# Patient Record
Sex: MALE | Race: WHITE | NOT HISPANIC OR LATINO | Employment: FULL TIME | URBAN - METROPOLITAN AREA
[De-identification: names, ages, dates, MRNs, and addresses within clinical notes are randomized per-mention and may not be internally consistent; named-entity substitution may affect disease eponyms.]

---

## 2023-07-03 PROBLEM — R97.20 INCREASED PROSTATE SPECIFIC ANTIGEN (PSA) VELOCITY: Status: ACTIVE | Noted: 2023-07-03

## 2023-07-03 PROBLEM — K64.9 HEMORRHOIDS: Status: ACTIVE | Noted: 2023-07-03

## 2023-07-03 PROBLEM — K64.8 INTERNAL AND EXTERNAL HEMORRHOIDS WITHOUT COMPLICATION: Status: ACTIVE | Noted: 2023-07-03

## 2023-07-03 PROBLEM — K64.4 EXTERNAL HEMORRHOIDS: Status: ACTIVE | Noted: 2023-07-03

## 2023-07-03 PROBLEM — Z97.3 WEARS GLASSES: Status: ACTIVE | Noted: 2023-07-03

## 2023-07-03 PROBLEM — E04.1 RIGHT THYROID NODULE: Status: ACTIVE | Noted: 2023-07-03

## 2023-07-03 PROBLEM — G89.29 CHRONIC HEADACHES: Status: ACTIVE | Noted: 2023-07-03

## 2023-07-03 PROBLEM — K21.9 ACID REFLUX: Status: ACTIVE | Noted: 2023-07-03

## 2023-07-03 PROBLEM — R51.9 CHRONIC HEADACHES: Status: ACTIVE | Noted: 2023-07-03

## 2023-07-03 PROBLEM — M25.512 CHRONIC LEFT SHOULDER PAIN: Status: ACTIVE | Noted: 2023-07-03

## 2023-07-03 PROBLEM — F41.9 MILD ANXIETY: Status: ACTIVE | Noted: 2023-07-03

## 2023-07-03 PROBLEM — G89.29 CHRONIC LEFT SHOULDER PAIN: Status: ACTIVE | Noted: 2023-07-03

## 2023-07-03 PROBLEM — M21.42 FALLEN ARCHES: Status: ACTIVE | Noted: 2023-07-03

## 2023-07-03 PROBLEM — K64.4 INTERNAL AND EXTERNAL HEMORRHOIDS WITHOUT COMPLICATION: Status: ACTIVE | Noted: 2023-07-03

## 2023-07-03 PROBLEM — N40.0 BENIGN ENLARGEMENT OF PROSTATE: Status: ACTIVE | Noted: 2023-07-03

## 2023-07-03 PROBLEM — J98.8 RESPIRATORY TRACT INFECTION DUE TO COVID-19 VIRUS: Status: ACTIVE | Noted: 2023-07-03

## 2023-07-03 PROBLEM — E78.5 HYPERLIPIDEMIA: Status: ACTIVE | Noted: 2023-07-03

## 2023-07-03 PROBLEM — M21.41 FALLEN ARCHES: Status: ACTIVE | Noted: 2023-07-03

## 2023-07-03 PROBLEM — U07.1 RESPIRATORY TRACT INFECTION DUE TO COVID-19 VIRUS: Status: ACTIVE | Noted: 2023-07-03

## 2023-07-03 PROBLEM — N48.6 PEYRONIE DISEASE: Status: ACTIVE | Noted: 2023-07-03

## 2023-07-03 PROBLEM — R42 VERTIGO: Status: ACTIVE | Noted: 2023-07-03

## 2023-07-03 PROBLEM — J30.9 ALLERGIC RHINITIS: Status: ACTIVE | Noted: 2023-07-03

## 2023-07-03 PROBLEM — D12.6 TUBULAR ADENOMA OF COLON: Status: ACTIVE | Noted: 2023-07-03

## 2023-07-03 RX ORDER — MULTIVITAMIN
TABLET ORAL
COMMUNITY
Start: 2014-07-28

## 2023-07-03 RX ORDER — ATORVASTATIN CALCIUM 10 MG/1
TABLET, FILM COATED ORAL
COMMUNITY
Start: 2022-02-15 | End: 2024-02-01

## 2023-07-13 ENCOUNTER — OFFICE VISIT (OUTPATIENT)
Dept: PRIMARY CARE | Facility: CLINIC | Age: 63
End: 2023-07-13
Payer: COMMERCIAL

## 2023-07-13 VITALS
BODY MASS INDEX: 22.21 KG/M2 | WEIGHT: 152.6 LBS | OXYGEN SATURATION: 98 % | DIASTOLIC BLOOD PRESSURE: 74 MMHG | TEMPERATURE: 98.2 F | RESPIRATION RATE: 16 BRPM | SYSTOLIC BLOOD PRESSURE: 118 MMHG | HEART RATE: 68 BPM

## 2023-07-13 DIAGNOSIS — E78.5 DYSLIPIDEMIA, GOAL LDL BELOW 100: ICD-10-CM

## 2023-07-13 DIAGNOSIS — R42 VERTIGO: ICD-10-CM

## 2023-07-13 DIAGNOSIS — D12.6 TUBULAR ADENOMA OF COLON: ICD-10-CM

## 2023-07-13 DIAGNOSIS — J30.1 SEASONAL ALLERGIC RHINITIS DUE TO POLLEN: ICD-10-CM

## 2023-07-13 DIAGNOSIS — N40.1 BENIGN PROSTATIC HYPERPLASIA WITH NOCTURIA: ICD-10-CM

## 2023-07-13 DIAGNOSIS — Z71.85 IMMUNIZATION COUNSELING: Primary | ICD-10-CM

## 2023-07-13 DIAGNOSIS — E04.1 RIGHT THYROID NODULE: ICD-10-CM

## 2023-07-13 DIAGNOSIS — R35.1 BENIGN PROSTATIC HYPERPLASIA WITH NOCTURIA: ICD-10-CM

## 2023-07-13 PROBLEM — U07.1 RESPIRATORY TRACT INFECTION DUE TO COVID-19 VIRUS: Status: RESOLVED | Noted: 2023-07-03 | Resolved: 2023-07-13

## 2023-07-13 PROBLEM — J98.8 RESPIRATORY TRACT INFECTION DUE TO COVID-19 VIRUS: Status: RESOLVED | Noted: 2023-07-03 | Resolved: 2023-07-13

## 2023-07-13 PROCEDURE — 1036F TOBACCO NON-USER: CPT | Performed by: INTERNAL MEDICINE

## 2023-07-13 PROCEDURE — 99213 OFFICE O/P EST LOW 20 MIN: CPT | Performed by: INTERNAL MEDICINE

## 2023-07-13 ASSESSMENT — ENCOUNTER SYMPTOMS
LOSS OF SENSATION IN FEET: 0
OCCASIONAL FEELINGS OF UNSTEADINESS: 0
DEPRESSION: 0

## 2023-07-13 ASSESSMENT — PATIENT HEALTH QUESTIONNAIRE - PHQ9
1. LITTLE INTEREST OR PLEASURE IN DOING THINGS: NOT AT ALL
2. FEELING DOWN, DEPRESSED OR HOPELESS: NOT AT ALL
SUM OF ALL RESPONSES TO PHQ9 QUESTIONS 1 AND 2: 0

## 2023-07-13 NOTE — PROGRESS NOTES
Subjective   Patient ID: Patrice Licona is a 62 y.o. male who presents for Follow-up.    Here for semiannual visit  Feels well without illnesses or injuries.  Enjoying summer vacation off from school-he is doing some creative work painting, which he has not done for couple years.  Is been enjoyable.         Review of Systems    Objective   /74 (BP Location: Left arm, Patient Position: Sitting, BP Cuff Size: Adult)   Pulse 68   Temp 36.8 °C (98.2 °F)   Resp 16   Wt 69.2 kg (152 lb 9.6 oz)   SpO2 98%   BMI 22.21 kg/m²     Physical Exam  Vitals reviewed.   Constitutional:       Appearance: Normal appearance.   HENT:      Head: Normocephalic and atraumatic.   Eyes:      General: No scleral icterus.        Right eye: No discharge.         Left eye: No discharge.      Extraocular Movements: Extraocular movements intact.      Conjunctiva/sclera: Conjunctivae normal.      Pupils: Pupils are equal, round, and reactive to light.   Cardiovascular:      Rate and Rhythm: Normal rate and regular rhythm.      Pulses: Normal pulses.      Heart sounds: Normal heart sounds. No murmur heard.  Pulmonary:      Effort: Pulmonary effort is normal.      Breath sounds: Normal breath sounds. No wheezing or rhonchi.   Musculoskeletal:         General: No deformity or signs of injury. Normal range of motion.      Cervical back: Normal range of motion and neck supple. No rigidity or tenderness.   Lymphadenopathy:      Cervical: No cervical adenopathy.   Skin:     General: Skin is warm and dry.      Findings: No rash.   Neurological:      General: No focal deficit present.      Mental Status: He is alert and oriented to person, place, and time. Mental status is at baseline.      Cranial Nerves: No cranial nerve deficit.      Sensory: No sensory deficit.      Gait: Gait normal.   Psychiatric:         Mood and Affect: Mood normal.         Behavior: Behavior normal.         Thought Content: Thought content normal.         Judgment:  Judgment normal.         Assessment/Plan   Problem List Items Addressed This Visit       Allergic rhinitis    Benign enlargement of prostate    Relevant Orders    Prostate Specific Antigen    Right thyroid nodule    Tubular adenoma of colon    Vertigo    Relevant Orders    Basic Metabolic Panel     Other Visit Diagnoses       Immunization counseling    -  Primary    Relevant Medications    zoster vaccine-recombinant adjuvanted (Shingrix) 50 mcg/0.5 mL vaccine    Dyslipidemia, goal LDL below 100        Relevant Orders    Lipid Panel    TSH with reflex to Free T4 if abnormal    Alanine Aminotransferase             Dyslipidemia- Atorvastatin started in 2021 w/ elevated 10 yr cardiac risk; goal LDL under 100.  Will follow.          annual lipids ordered     Exercise routine-enjoys walking. Encouraged more aerobic activity as he is able to     Right thyroid nodule-he states his endocrinologist at the [facility] did not feel further follow-up was necessary and so he no longer sees him. Nondiagnostic biopsy/aspiration 2010.    Stable ultrasound July 2019 for 2 yrs showing 1 x 1 cm hypoechoic right thyroid nodule. Will plan to re-image at 4 yrs           Feb '23 thyroid ultrasound shows right thyroid nodule benign.      Seasonal allergies-he will use medication seasonally. Not problematic of late; Zyrtec and Flonase PRN.   More winter allergies. Several weeks of congestion sinus pressure and ear popping. Discussed anatomy and vertigo. Encouraged Flonase and Zyrtec consistently for 3 to 4 weeks then as needed     Tension buzfkbeld-wfyhqlhiscyl-sbfslitb imaging in neurology by Dr. Fu around 2012. During the pandemic he has noticed more headaches. This year he is commuting back to school. The students do not really want to be in school. He remains on his sertraline which has been helpful.    Encourage stress reduction measures. He does enjoy painting which he did a lot of in the summer, puzzles and playing his guitar.  Suggested meditation apps including head space. He enjoys walking-suggested exercising to help reduce stress as well. Heating pad and stretching for the neck encouraged-exercises provided               Presently headaches are improved        Mild depression-doing well with present routine and sertraline. He will continue healthy efforts towards maintaining his balance.    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 Looking forward to getting up to [employer] in their RV for camping around [place].               Summer vacation has gone well.  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  Its been more relaxing this summer.     Adenomatous colon polyps/Family history of colon cancer-. Colonoscopy updated June 2019- next in 5 years-June 2024.      External hemorrhoids-rather bothersome as they come out daily after bowel movement and he has to push them back in. He is taking fluid and fiber. I suggested Desitin ointment as needed and follow-up with the colorectal clinic.   Improved presently, w/ metamucil, following visit w/ Dr. Golden for int. hem. Jun & Aug '22.     Elevated PSA velocity - 1st prostate biopsy negative around age 56, worrisome MRI February 22 prompted a 2nd prostate biopsy Apr '22 - negative. He'll see Dr. Amaya in 1 yr - Apr '23.              Annual PSA ordered for next time.      Left wrist injury-after a fall in 2020- occasionally sore-he is right-handed. He will use caution with overuse     C-spine spasms-heating pad and stretching encouraged consistently to help minimize tension headache concern     Recurrent/chronic left shoulder pain-a bit worse recently lifting is done. He will see Dr. English as needed. Using a chiropractor several times, which has helped. Improved w/ input per Chiropractor   Shoulder much better not carrying heavy book bag   At this point both shoulders are a problem. Once again exercises provided encouraged him to do these consistently and  follow-up with physical therapy if not improved     Low back pain - after moving. improved w/ chiropractor input.     Tingling in his toes-  Less noticeable presently with changing shoes suggesting compressive neuropathy     fallen arches -he now is on his feet as an  most of the day. Encouraged optimizing arch support accordingly     Acid reflux he will continue medications accordingly        Dental care-encouraged semiannual dental visits. Dental exam this week.     Skin care-he will continue dermatology as needed. sun screen encouraged   Dermatology appt. in Jan '22 for skin check     Glasses/vision care- vision exam updated early 2022; no changes fortunately.      Flu shot encouraged each fall-      covid series completed- booster done too; new booster declined 12/22     Discussed Shingrix / new shingles shot - 2 shot series w/ limited availability. Encouraged patient to consider getting this when/ where available.    discussed / declined 12/22     Follow-up semiannually, sooner as needed     Charting was completed using voice recognition technology and may include unintended errors.

## 2024-01-15 ENCOUNTER — OFFICE VISIT (OUTPATIENT)
Dept: PRIMARY CARE | Facility: CLINIC | Age: 64
End: 2024-01-15
Payer: COMMERCIAL

## 2024-01-15 VITALS
RESPIRATION RATE: 16 BRPM | HEART RATE: 81 BPM | HEIGHT: 70 IN | OXYGEN SATURATION: 97 % | BODY MASS INDEX: 22.36 KG/M2 | WEIGHT: 156.2 LBS | DIASTOLIC BLOOD PRESSURE: 73 MMHG | SYSTOLIC BLOOD PRESSURE: 115 MMHG | TEMPERATURE: 98.2 F

## 2024-01-15 DIAGNOSIS — M62.838 CERVICAL PARASPINAL MUSCLE SPASM: ICD-10-CM

## 2024-01-15 DIAGNOSIS — E78.2 MIXED HYPERLIPIDEMIA: ICD-10-CM

## 2024-01-15 DIAGNOSIS — Z23 IMMUNIZATION DUE: Primary | ICD-10-CM

## 2024-01-15 DIAGNOSIS — R07.89 ATYPICAL CHEST PAIN: ICD-10-CM

## 2024-01-15 DIAGNOSIS — J30.1 SEASONAL ALLERGIC RHINITIS DUE TO POLLEN: ICD-10-CM

## 2024-01-15 DIAGNOSIS — D12.6 ADENOMATOUS POLYP OF COLON, UNSPECIFIED PART OF COLON: ICD-10-CM

## 2024-01-15 DIAGNOSIS — Z97.3 WEARS GLASSES: ICD-10-CM

## 2024-01-15 PROCEDURE — 1036F TOBACCO NON-USER: CPT | Performed by: INTERNAL MEDICINE

## 2024-01-15 PROCEDURE — 93000 ELECTROCARDIOGRAM COMPLETE: CPT | Performed by: INTERNAL MEDICINE

## 2024-01-15 PROCEDURE — 99396 PREV VISIT EST AGE 40-64: CPT | Performed by: INTERNAL MEDICINE

## 2024-01-15 ASSESSMENT — PATIENT HEALTH QUESTIONNAIRE - PHQ9
2. FEELING DOWN, DEPRESSED OR HOPELESS: NOT AT ALL
1. LITTLE INTEREST OR PLEASURE IN DOING THINGS: NOT AT ALL
SUM OF ALL RESPONSES TO PHQ9 QUESTIONS 1 AND 2: 0

## 2024-01-15 ASSESSMENT — ENCOUNTER SYMPTOMS
LOSS OF SENSATION IN FEET: 0
OCCASIONAL FEELINGS OF UNSTEADINESS: 0
DEPRESSION: 0

## 2024-01-15 NOTE — PROGRESS NOTES
"Subjective   Patient ID: Patrice Licona is a 63 y.o. male who presents for Annual Exam.    Here for semiannual visit and wellness visit.  Overall doing well.      He does notice left upper chest wall pain-it tends to come and go without exertion no swallowing issues no heartburn no cough or shortness of breath or respiratory symptoms.    Denies any illnesses or injuries no exertional chest pain, palpitations, dizziness, orthopnea or pedal edema.    Additionally he notices neck popping and occasional stiffness           Review of Systems    Objective   /73 (BP Location: Left arm, Patient Position: Sitting, BP Cuff Size: Adult)   Pulse 81   Temp 36.8 °C (98.2 °F)   Resp 16   Ht 1.765 m (5' 9.5\")   Wt 70.9 kg (156 lb 3.2 oz)   SpO2 97%   BMI 22.74 kg/m²     Physical Exam  Constitutional:       Appearance: Normal appearance.   HENT:      Head: Normocephalic and atraumatic.      Right Ear: Tympanic membrane normal.      Left Ear: Tympanic membrane normal.      Nose: Nose normal.   Eyes:      General: No scleral icterus.     Extraocular Movements: Extraocular movements intact.      Conjunctiva/sclera: Conjunctivae normal.      Pupils: Pupils are equal, round, and reactive to light.   Cardiovascular:      Rate and Rhythm: Normal rate and regular rhythm.      Pulses: Normal pulses.      Heart sounds: Normal heart sounds. No murmur heard.  Pulmonary:      Effort: Pulmonary effort is normal. No respiratory distress.      Breath sounds: Normal breath sounds. No stridor. No wheezing.   Abdominal:      General: Abdomen is flat. Bowel sounds are normal. There is no distension.      Palpations: Abdomen is soft. There is no mass.      Tenderness: There is no abdominal tenderness. There is no guarding.   Musculoskeletal:         General: No swelling, tenderness or deformity. Normal range of motion.      Cervical back: Normal range of motion and neck supple. No tenderness.   Lymphadenopathy:      Cervical: No cervical " adenopathy.   Skin:     General: Skin is warm and dry.      Findings: No lesion or rash.   Neurological:      General: No focal deficit present.      Mental Status: He is alert and oriented to person, place, and time.      Cranial Nerves: No cranial nerve deficit.      Motor: No weakness.   Psychiatric:         Mood and Affect: Mood normal.         Behavior: Behavior normal.         Thought Content: Thought content normal.         Judgment: Judgment normal.         Assessment/Plan   Problem List Items Addressed This Visit             ICD-10-CM    Allergic rhinitis J30.9    Hyperlipidemia E78.5    Relevant Orders    CT cardiac scoring wo IV contrast    Wears glasses Z97.3     Other Visit Diagnoses         Codes    Immunization due    -  Primary Z23    Relevant Medications    respiratory syncytial virus, RSV, vaccine, adjuvanted, age 65y+ (AREXVY) 120 mcg/0.5 mL suspension for reconstitution    Adenomatous polyp of colon, unspecified part of colon     D12.6    Relevant Orders    Colonoscopy Diagnostic    Cervical paraspinal muscle spasm     M62.838    Relevant Orders    XR cervical spine 2-3 views    Atypical chest pain     R07.89    Relevant Orders    XR chest 2 views             Incomplete database-blood work ordered last summer not yet done-he will do soon    Chest pain syndrome-not exertional-rather atypical.  Chest x-ray ordered      Dyslipidemia- Atorvastatin started in 2021 w/ elevated 10 yr cardiac risk; goal LDL under 100.  Will follow.          annual lipids ordered last time-awaiting results.  He will set up a CT calcium score for further evaluation accordingly     Exercise routine-enjoys walking. Encouraged more aerobic activity as he is able to     Right thyroid nodule-he states his endocrinologist at the [facility] did not feel further follow-up was necessary and so he no longer sees him. Nondiagnostic biopsy/aspiration 2010.    Stable ultrasound July 2019 for 2 yrs showing 1 x 1 cm hypoechoic  right thyroid nodule. Will plan to re-image at 4 yrs           Feb '23 thyroid ultrasound shows right thyroid nodule benign.      Seasonal allergies-he will use medication seasonally. Not problematic of late; Zyrtec and Flonase PRN.   More winter allergies. Several weeks of congestion sinus pressure and ear popping. Discussed anatomy and vertigo. Encouraged Flonase and Zyrtec consistently for 3 to 4 weeks then as needed    Cervical spasms-with occasional neck popping-x-rays ordered 1/24.  Handout provided with 2 pages of exercises-encouraged 20 minutes once or twice daily with a heating pad and stretching and follow-up if not improved     Tension bonxkbwpb-wolgwtadfkhy-rguoiofe imaging in neurology by Dr. Fu around 2012. During the pandemic he has noticed more headaches. This year he is commuting back to school. The students do not really want to be in school. He remains on his sertraline which has been helpful.    Encourage stress reduction measures. He does enjoy painting which he did a lot of in the summer, puzzles and playing his guitar. Suggested meditation apps including head space. He enjoys walking-suggested exercising to help reduce stress as well. Heating pad and stretching for the neck encouraged-exercises provided               Presently headaches are improved        Mild depression-doing well with present routine and sertraline. He will continue healthy efforts towards maintaining his balance.    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 Looking forward to getting up to [employer] in their RV for camping around [place].               Summer vacation has gone well.  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  Its been more relaxing this summer.    Acid reflux he will continue medications accordingly     Adenomatous colon polyps/Family history of colon cancer-. Colonoscopy updated June 2019-             next in 5 years-June 2024.  Ordered     External  hemorrhoids-rather bothersome as they come out daily after bowel movement and he has to push them back in. He is taking fluid and fiber. I suggested Desitin ointment as needed and follow-up with the colorectal clinic.   Improved presently, w/ metamucil, following visit w/ Dr. Golden for int. hem. Jun & Aug '22.     Elevated PSA velocity - 1st prostate biopsy negative around age 56, worrisome MRI February 22 prompted a 2nd prostate biopsy Apr '22 - negative. He'll see Dr. Amaya in 1 yr - Apr '23.              Annual PSA ordered for next time.      Left wrist injury-after a fall in 2020- occasionally sore-he is right-handed. He will use caution with overuse     C-spine spasms-heating pad and stretching encouraged consistently to help minimize tension headache concern     Recurrent/chronic left shoulder pain-a bit worse recently lifting is done. He will see Dr. English as needed. Using a chiropractor several times, which has helped. Improved w/ input per Chiropractor   Shoulder much better not carrying heavy book bag   At this point both shoulders are a problem. Once again exercises provided encouraged him to do these consistently and follow-up with physical therapy if not improved     Low back pain - after moving. improved w/ chiropractor input.     Tingling in his toes-  Less noticeable presently with changing shoes suggesting compressive neuropathy     fallen arches -he now is on his feet as an  most of the day. Encouraged optimizing arch support accordingly        Dental care-encouraged semiannual dental visits.             Dental exam due for tooth pain     Skin care-he will continue dermatology as needed. sun screen encouraged   Dermatology appt. in Jan '22 for skin check     Glasses/vision care- vision exam updated early 2022; no changes fortunately.      Flu shot encouraged each fall- Sep '23     covid series completed- booster done too;           new booster declined 12/22     Discussed Shingrix /  new shingles shot - 2 shot series w/ limited availability. Encouraged patient to consider getting this when/ where available.                  1st injection Sep '23; he'll do the 2nd soon    RSV vacc encouraged     Follow-up semiannually, sooner as needed     Charting was completed using voice recognition technology and may include unintended errors.

## 2024-01-27 ENCOUNTER — LAB (OUTPATIENT)
Dept: LAB | Facility: LAB | Age: 64
End: 2024-01-27
Payer: COMMERCIAL

## 2024-01-27 ENCOUNTER — HOSPITAL ENCOUNTER (OUTPATIENT)
Dept: RADIOLOGY | Facility: CLINIC | Age: 64
Discharge: HOME | End: 2024-01-27
Payer: COMMERCIAL

## 2024-01-27 DIAGNOSIS — R35.1 BENIGN PROSTATIC HYPERPLASIA WITH NOCTURIA: ICD-10-CM

## 2024-01-27 DIAGNOSIS — R07.89 ATYPICAL CHEST PAIN: ICD-10-CM

## 2024-01-27 DIAGNOSIS — M62.838 CERVICAL PARASPINAL MUSCLE SPASM: ICD-10-CM

## 2024-01-27 DIAGNOSIS — E78.5 DYSLIPIDEMIA, GOAL LDL BELOW 100: ICD-10-CM

## 2024-01-27 DIAGNOSIS — N40.1 BENIGN PROSTATIC HYPERPLASIA WITH NOCTURIA: ICD-10-CM

## 2024-01-27 DIAGNOSIS — R42 VERTIGO: ICD-10-CM

## 2024-01-27 LAB
ALT SERPL W P-5'-P-CCNC: 24 U/L (ref 10–52)
ANION GAP SERPL CALC-SCNC: 11 MMOL/L (ref 10–20)
BUN SERPL-MCNC: 15 MG/DL (ref 6–23)
CALCIUM SERPL-MCNC: 9.8 MG/DL (ref 8.6–10.3)
CHLORIDE SERPL-SCNC: 104 MMOL/L (ref 98–107)
CHOLEST SERPL-MCNC: 167 MG/DL (ref 0–199)
CHOLESTEROL/HDL RATIO: 3.7
CO2 SERPL-SCNC: 30 MMOL/L (ref 21–32)
CREAT SERPL-MCNC: 0.9 MG/DL (ref 0.5–1.3)
EGFRCR SERPLBLD CKD-EPI 2021: >90 ML/MIN/1.73M*2
GLUCOSE SERPL-MCNC: 86 MG/DL (ref 74–99)
HDLC SERPL-MCNC: 45.5 MG/DL
LDLC SERPL CALC-MCNC: 93 MG/DL
NON HDL CHOLESTEROL: 122 MG/DL (ref 0–149)
POTASSIUM SERPL-SCNC: 4.9 MMOL/L (ref 3.5–5.3)
PSA SERPL-MCNC: 2.68 NG/ML
SODIUM SERPL-SCNC: 140 MMOL/L (ref 136–145)
TRIGL SERPL-MCNC: 143 MG/DL (ref 0–149)
TSH SERPL-ACNC: 2.96 MIU/L (ref 0.44–3.98)
VLDL: 29 MG/DL (ref 0–40)

## 2024-01-27 PROCEDURE — 72040 X-RAY EXAM NECK SPINE 2-3 VW: CPT

## 2024-01-27 PROCEDURE — 71046 X-RAY EXAM CHEST 2 VIEWS: CPT | Performed by: RADIOLOGY

## 2024-01-27 PROCEDURE — 84460 ALANINE AMINO (ALT) (SGPT): CPT

## 2024-01-27 PROCEDURE — 71046 X-RAY EXAM CHEST 2 VIEWS: CPT

## 2024-01-27 PROCEDURE — 84153 ASSAY OF PSA TOTAL: CPT

## 2024-01-27 PROCEDURE — 80048 BASIC METABOLIC PNL TOTAL CA: CPT

## 2024-01-27 PROCEDURE — 80061 LIPID PANEL: CPT

## 2024-01-27 PROCEDURE — 36415 COLL VENOUS BLD VENIPUNCTURE: CPT

## 2024-01-27 PROCEDURE — 84443 ASSAY THYROID STIM HORMONE: CPT

## 2024-01-27 PROCEDURE — 72040 X-RAY EXAM NECK SPINE 2-3 VW: CPT | Performed by: RADIOLOGY

## 2024-01-29 NOTE — RESULT ENCOUNTER NOTE
Patrice    I am glad the radiologist notes that there are no worrisome findings on the cervical spine x-rays.  Please let me know if any symptoms persist.  Always continue a heating pad and stretching routine regularly, which will help with neck spasms.  Please let me know if you have any additional concerns.    Sincerely,  Rosales Light MD

## 2024-01-29 NOTE — RESULT ENCOUNTER NOTE
Patrice-thanks for doing the annual fasting labs.  I am pleased that the kidney, liver, thyroid and prostate labs look fine.  The cholesterol panel shows that the LDL/bad cholesterol is under 100, which is the goal.  Keep up the good work.  Finally the glucose/sugar is normal without signs of diabetes.    Wishing you the best of health in the new year.    Sincerely,  Rosales Light MD

## 2024-01-29 NOTE — RESULT ENCOUNTER NOTE
Patrice    Thanks for doing the chest x-ray.  I am glad the radiologist notes that there are no worrisome chest findings.  Please notify me if any symptoms persist.    Sincerely,  Rosales Light MD

## 2024-02-01 DIAGNOSIS — E78.5 DYSLIPIDEMIA, GOAL LDL BELOW 100: Primary | ICD-10-CM

## 2024-02-01 RX ORDER — ATORVASTATIN CALCIUM 10 MG/1
10 TABLET, FILM COATED ORAL DAILY
Qty: 90 TABLET | Refills: 3 | Status: SHIPPED | OUTPATIENT
Start: 2024-02-01

## 2024-03-11 DIAGNOSIS — F32.A DEPRESSION, UNSPECIFIED DEPRESSION TYPE: ICD-10-CM

## 2024-03-11 RX ORDER — SERTRALINE HYDROCHLORIDE 100 MG/1
100 TABLET, FILM COATED ORAL DAILY
Qty: 90 TABLET | Refills: 1 | Status: SHIPPED | OUTPATIENT
Start: 2024-03-11

## 2024-06-02 ENCOUNTER — HOSPITAL ENCOUNTER (OUTPATIENT)
Dept: RADIOLOGY | Facility: CLINIC | Age: 64
Discharge: HOME | End: 2024-06-02
Payer: COMMERCIAL

## 2024-06-02 DIAGNOSIS — E78.2 MIXED HYPERLIPIDEMIA: ICD-10-CM

## 2024-06-02 PROCEDURE — 75571 CT HRT W/O DYE W/CA TEST: CPT

## 2024-06-06 PROBLEM — R93.1 AGATSTON CORONARY ARTERY CALCIUM SCORE LESS THAN 100: Status: ACTIVE | Noted: 2024-06-06

## 2024-06-06 NOTE — RESULT ENCOUNTER NOTE
Patrice    Thank you for doing the CT calcium score.  I am thrilled to report that the calcium score is 0.  This is a very favorable result and means that the likelihood of underlying coronary artery disease is extremely low.  Once again thanks for doing this very important cardiac test.    Sincerely,  Rosales Light MD

## 2024-07-31 ENCOUNTER — APPOINTMENT (OUTPATIENT)
Dept: PRIMARY CARE | Facility: CLINIC | Age: 64
End: 2024-07-31
Payer: COMMERCIAL

## 2024-07-31 VITALS
WEIGHT: 151.6 LBS | BODY MASS INDEX: 21.7 KG/M2 | TEMPERATURE: 98.4 F | DIASTOLIC BLOOD PRESSURE: 70 MMHG | RESPIRATION RATE: 16 BRPM | SYSTOLIC BLOOD PRESSURE: 106 MMHG | OXYGEN SATURATION: 97 % | HEIGHT: 70 IN | HEART RATE: 69 BPM

## 2024-07-31 DIAGNOSIS — E04.1 RIGHT THYROID NODULE: ICD-10-CM

## 2024-07-31 DIAGNOSIS — N40.1 BENIGN PROSTATIC HYPERPLASIA WITH NOCTURIA: ICD-10-CM

## 2024-07-31 DIAGNOSIS — E78.2 MIXED HYPERLIPIDEMIA: ICD-10-CM

## 2024-07-31 DIAGNOSIS — D12.6 ADENOMATOUS POLYP OF COLON, UNSPECIFIED PART OF COLON: ICD-10-CM

## 2024-07-31 DIAGNOSIS — E55.9 VITAMIN D DEFICIENCY: Primary | ICD-10-CM

## 2024-07-31 DIAGNOSIS — R35.1 BENIGN PROSTATIC HYPERPLASIA WITH NOCTURIA: ICD-10-CM

## 2024-07-31 PROBLEM — M25.519 SHOULDER PAIN: Status: ACTIVE | Noted: 2024-07-31

## 2024-07-31 PROBLEM — M21.40 ACQUIRED PES PLANUS: Status: ACTIVE | Noted: 2024-07-31

## 2024-07-31 PROCEDURE — 99213 OFFICE O/P EST LOW 20 MIN: CPT | Performed by: INTERNAL MEDICINE

## 2024-07-31 PROCEDURE — 3008F BODY MASS INDEX DOCD: CPT | Performed by: INTERNAL MEDICINE

## 2024-07-31 PROCEDURE — 1036F TOBACCO NON-USER: CPT | Performed by: INTERNAL MEDICINE

## 2024-07-31 RX ORDER — PSYLLIUM SEED
PACKET (EA) ORAL
COMMUNITY
Start: 2022-12-30

## 2024-07-31 ASSESSMENT — PATIENT HEALTH QUESTIONNAIRE - PHQ9
1. LITTLE INTEREST OR PLEASURE IN DOING THINGS: NOT AT ALL
SUM OF ALL RESPONSES TO PHQ9 QUESTIONS 1 AND 2: 0
2. FEELING DOWN, DEPRESSED OR HOPELESS: NOT AT ALL

## 2024-07-31 NOTE — PROGRESS NOTES
"Subjective   Patient ID: Patrice Licona is a 63 y.o. male who presents for Follow-up.    Here for follow-up  No illnesses or injuries  Shoulders occasionally stiff         Review of Systems    Objective   /70 (BP Location: Left arm, Patient Position: Sitting, BP Cuff Size: Adult)   Pulse 69   Temp 36.9 °C (98.4 °F)   Resp 16   Ht 1.765 m (5' 9.5\")   Wt 68.8 kg (151 lb 9.6 oz)   SpO2 97%   BMI 22.07 kg/m²     Physical Exam  Vitals reviewed.   Constitutional:       Appearance: Normal appearance.   HENT:      Head: Normocephalic and atraumatic.   Eyes:      General: No scleral icterus.        Right eye: No discharge.         Left eye: No discharge.      Extraocular Movements: Extraocular movements intact.      Conjunctiva/sclera: Conjunctivae normal.      Pupils: Pupils are equal, round, and reactive to light.   Cardiovascular:      Rate and Rhythm: Normal rate and regular rhythm.      Pulses: Normal pulses.      Heart sounds: Normal heart sounds. No murmur heard.  Pulmonary:      Effort: Pulmonary effort is normal.      Breath sounds: Normal breath sounds. No wheezing or rhonchi.   Musculoskeletal:         General: No deformity or signs of injury. Normal range of motion.      Cervical back: Normal range of motion and neck supple. No rigidity or tenderness.   Lymphadenopathy:      Cervical: No cervical adenopathy.   Skin:     General: Skin is warm and dry.      Findings: No rash.   Neurological:      General: No focal deficit present.      Mental Status: He is alert and oriented to person, place, and time. Mental status is at baseline.      Cranial Nerves: No cranial nerve deficit.      Sensory: No sensory deficit.      Gait: Gait normal.   Psychiatric:         Mood and Affect: Mood normal.         Behavior: Behavior normal.         Thought Content: Thought content normal.         Judgment: Judgment normal.         Assessment/Plan   Problem List Items Addressed This Visit             ICD-10-CM    Benign " enlargement of prostate N40.0    Relevant Orders    Prostate Specific Antigen    Hyperlipidemia E78.5    Relevant Orders    Lipid Panel    TSH with reflex to Free T4 if abnormal    Alanine Aminotransferase    Right thyroid nodule E04.1    Relevant Orders    Basic Metabolic Panel    CBC    Vitamin D 25-Hydroxy,Total (for eval of Vitamin D levels)     Other Visit Diagnoses         Codes    Vitamin D deficiency    -  Primary E55.9    Relevant Orders    Vitamin D 25-Hydroxy,Total (for eval of Vitamin D levels)    Adenomatous polyp of colon, unspecified part of colon     D12.6    Relevant Orders    Colonoscopy Diagnostic             Portions of this encounter note have been copied from my previous note dated   , which have been updated where appropriate and all reflect my current medical decision making from today.       Chest pain syndrome-1/24-not exertional-rather atypical.  Chest x-ray ordered-unremarkable.  Resolved      Dyslipidemia- Atorvastatin started in 2021 w/ elevated 10 yr cardiac risk; goal LDL under 100.  Will follow.          annual lipids ordered last time-awaiting results.  He will set up a CT calcium score for further evaluation accordingly             Calcium score 0 June 2024              He will do lipids before follow-up     Exercise routine-enjoys walking. Encouraged more aerobic activity as he is able to     Right thyroid nodule-he states his endocrinologist at the [facility] did not feel further follow-up was necessary and so he no longer sees him. Nondiagnostic biopsy/aspiration 2010.    Stable ultrasound July 2019 for 2 yrs showing 1 x 1 cm hypoechoic right thyroid nodule. Will plan to re-image at 4 yrs           Feb '23 thyroid ultrasound shows right thyroid nodule benign.      Seasonal allergies-he will use medication seasonally. Not problematic of late; Zyrtec and Flonase PRN.   More winter allergies. Several weeks of congestion sinus pressure and ear popping. Discussed anatomy and  vertigo. Encouraged Flonase and Zyrtec consistently for 3 to 4 weeks then as needed              Improved presently    Cervical spasms-with occasional neck popping-x-rays ordered 1/24.  Handout provided with 2 pages of exercises-encouraged 20 minutes once or twice daily with a heating pad and stretching and follow-up if not improved    Shoulder spasms-occasionally noted-encouraged heating and stretching to prevent frozen shoulder scenario or sleeping shoulder pain.     Tension ektlakpmx-fziifkahyxzq-xabyrssq imaging in neurology by Dr. Fu around 2012. During the pandemic he has noticed more headaches. This year he is commuting back to school. The students do not really want to be in school. He remains on his sertraline which has been helpful.    Encourage stress reduction measures. He does enjoy painting which he did a lot of in the summer, puzzles and playing his guitar. Suggested meditation apps including head space. He enjoys walking-suggested exercising to help reduce stress as well. Heating pad and stretching for the neck encouraged-exercises provided               Presently headaches are improved        Mild depression-doing well with present routine and sertraline. He will continue healthy efforts towards maintaining his balance.    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 Looking forward to getting up to [employer] in their RV for camping around [place].               Summer vacation has gone well.  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  Its been more relaxing this summer.    Acid reflux he will continue medications accordingly     Adenomatous colon polyps/Family history of colon cancer-. Colonoscopy updated June 2019-             next in 5 years-June 2024.  Ordered     External hemorrhoids-rather bothersome as they come out daily after bowel movement and he has to push them back in. He is taking fluid and fiber. I suggested Desitin ointment as needed and  follow-up with the colorectal clinic.   Improved presently, w/ metamucil, following visit w/ Dr. Golden for int. hem. Jun & Aug '22.     Elevated PSA velocity - 1st prostate biopsy negative around age 56, worrisome MRI February 22 prompted a 2nd prostate biopsy Apr '22 - negative. He'll see Dr. Amaya in 1 yr - Apr '23.              Annual PSA ordered for next time.      Left wrist injury-after a fall in 2020- occasionally sore-he is right-handed. He will use caution with overuse     C-spine spasms-heating pad and stretching encouraged consistently to help minimize tension headache concern     Recurrent/chronic left shoulder pain-a bit worse recently lifting is done. He will see Dr. English as needed. Using a chiropractor several times, which has helped. Improved w/ input per Chiropractor   Shoulder much better not carrying heavy book bag   At this point both shoulders are a problem. Once again exercises provided encouraged him to do these consistently and follow-up with physical therapy if not improved     Low back pain - after moving. improved w/ chiropractor input.     Tingling in his toes-  Less noticeable presently with changing shoes suggesting compressive neuropathy     fallen arches -he now is on his feet as an  most of the day. Encouraged optimizing arch support accordingly        Dental care-encouraged semiannual dental visits.             Dental exam due for tooth pain     Skin care-he will continue dermatology as needed. sun screen encouraged   Dermatology appt. in Jan '22 for skin check     Glasses/vision care- vision exam updated early 2022; no changes fortunately.      Flu shot encouraged each fall- Sep '23     covid series completed- booster done too;           new booster declined 12/22     Discussed Shingrix / new shingles shot - 2 shot series w/ limited availability. Encouraged patient to consider getting this when/ where available.                  1st injection Sep '23; he'll do the  2nd soon    RSV vacc encouraged     Follow-up semiannually, sooner as needed     Charting was completed using voice recognition technology and may include unintended errors.

## 2024-09-06 DIAGNOSIS — F32.A DEPRESSION, UNSPECIFIED DEPRESSION TYPE: ICD-10-CM

## 2024-09-06 RX ORDER — SERTRALINE HYDROCHLORIDE 100 MG/1
100 TABLET, FILM COATED ORAL DAILY
Qty: 90 TABLET | Refills: 1 | Status: SHIPPED | OUTPATIENT
Start: 2024-09-06

## 2024-11-11 ENCOUNTER — TELEPHONE (OUTPATIENT)
Dept: PRIMARY CARE | Facility: CLINIC | Age: 64
End: 2024-11-11
Payer: COMMERCIAL

## 2024-12-30 ENCOUNTER — APPOINTMENT (OUTPATIENT)
Dept: GASTROENTEROLOGY | Facility: EXTERNAL LOCATION | Age: 64
End: 2024-12-30
Payer: COMMERCIAL

## 2025-01-17 ENCOUNTER — APPOINTMENT (OUTPATIENT)
Dept: PRIMARY CARE | Facility: CLINIC | Age: 65
End: 2025-01-17
Payer: COMMERCIAL

## 2025-01-20 ENCOUNTER — APPOINTMENT (OUTPATIENT)
Dept: PRIMARY CARE | Facility: CLINIC | Age: 65
End: 2025-01-20
Payer: COMMERCIAL

## 2025-01-27 DIAGNOSIS — E78.5 DYSLIPIDEMIA, GOAL LDL BELOW 100: ICD-10-CM

## 2025-01-27 RX ORDER — ATORVASTATIN CALCIUM 10 MG/1
10 TABLET, FILM COATED ORAL DAILY
Qty: 90 TABLET | Refills: 3 | Status: SHIPPED | OUTPATIENT
Start: 2025-01-27

## 2025-03-05 DIAGNOSIS — F32.A DEPRESSION, UNSPECIFIED DEPRESSION TYPE: ICD-10-CM

## 2025-03-05 RX ORDER — SERTRALINE HYDROCHLORIDE 100 MG/1
100 TABLET, FILM COATED ORAL DAILY
Qty: 90 TABLET | Refills: 1 | Status: SHIPPED | OUTPATIENT
Start: 2025-03-05

## 2025-07-09 DIAGNOSIS — Z12.11 COLON CANCER SCREENING: ICD-10-CM

## 2025-07-10 RX ORDER — SODIUM, POTASSIUM,MAG SULFATES 17.5-3.13G
1 SOLUTION, RECONSTITUTED, ORAL ORAL EVERY 12 HOURS
Qty: 2 EACH | Refills: 0 | Status: SHIPPED | OUTPATIENT
Start: 2025-07-10

## 2025-07-16 LAB
25(OH)D3+25(OH)D2 SERPL-MCNC: 41 NG/ML (ref 30–100)
ALT SERPL-CCNC: 22 U/L (ref 9–46)
ANION GAP SERPL CALCULATED.4IONS-SCNC: 8 MMOL/L (CALC) (ref 7–17)
BUN SERPL-MCNC: 18 MG/DL (ref 7–25)
BUN/CREAT SERPL: NORMAL (CALC) (ref 6–22)
CALCIUM SERPL-MCNC: 9.5 MG/DL (ref 8.6–10.3)
CHLORIDE SERPL-SCNC: 104 MMOL/L (ref 98–110)
CHOLEST SERPL-MCNC: 148 MG/DL
CHOLEST/HDLC SERPL: 3.1 (CALC)
CO2 SERPL-SCNC: 27 MMOL/L (ref 20–32)
CREAT SERPL-MCNC: 0.91 MG/DL (ref 0.7–1.35)
EGFRCR SERPLBLD CKD-EPI 2021: 95 ML/MIN/1.73M2
ERYTHROCYTE [DISTWIDTH] IN BLOOD BY AUTOMATED COUNT: 12.2 % (ref 11–15)
GLUCOSE SERPL-MCNC: 91 MG/DL (ref 65–99)
HCT VFR BLD AUTO: 48.7 % (ref 38.5–50)
HDLC SERPL-MCNC: 47 MG/DL
HGB BLD-MCNC: 16.5 G/DL (ref 13.2–17.1)
LDLC SERPL CALC-MCNC: 80 MG/DL (CALC)
MCH RBC QN AUTO: 31 PG (ref 27–33)
MCHC RBC AUTO-ENTMCNC: 33.9 G/DL (ref 32–36)
MCV RBC AUTO: 91.4 FL (ref 80–100)
NONHDLC SERPL-MCNC: 101 MG/DL (CALC)
PLATELET # BLD AUTO: 245 THOUSAND/UL (ref 140–400)
PMV BLD REES-ECKER: 10.7 FL (ref 7.5–12.5)
POTASSIUM SERPL-SCNC: 5.1 MMOL/L (ref 3.5–5.3)
PSA SERPL-MCNC: 2.13 NG/ML
RBC # BLD AUTO: 5.33 MILLION/UL (ref 4.2–5.8)
SODIUM SERPL-SCNC: 139 MMOL/L (ref 135–146)
TRIGL SERPL-MCNC: 118 MG/DL
TSH SERPL-ACNC: 2.94 MIU/L (ref 0.4–4.5)
WBC # BLD AUTO: 6.7 THOUSAND/UL (ref 3.8–10.8)

## 2025-07-21 ENCOUNTER — ANESTHESIA (OUTPATIENT)
Dept: GASTROENTEROLOGY | Facility: HOSPITAL | Age: 65
End: 2025-07-21
Payer: COMMERCIAL

## 2025-07-21 ENCOUNTER — HOSPITAL ENCOUNTER (OUTPATIENT)
Dept: GASTROENTEROLOGY | Facility: HOSPITAL | Age: 65
Discharge: HOME | End: 2025-07-21
Payer: COMMERCIAL

## 2025-07-21 ENCOUNTER — ANESTHESIA EVENT (OUTPATIENT)
Dept: GASTROENTEROLOGY | Facility: HOSPITAL | Age: 65
End: 2025-07-21
Payer: COMMERCIAL

## 2025-07-21 VITALS
DIASTOLIC BLOOD PRESSURE: 64 MMHG | WEIGHT: 150 LBS | OXYGEN SATURATION: 97 % | SYSTOLIC BLOOD PRESSURE: 103 MMHG | HEIGHT: 70 IN | TEMPERATURE: 97.5 F | HEART RATE: 75 BPM | BODY MASS INDEX: 21.47 KG/M2 | RESPIRATION RATE: 17 BRPM

## 2025-07-21 DIAGNOSIS — D12.6 ADENOMATOUS POLYP OF COLON, UNSPECIFIED PART OF COLON: ICD-10-CM

## 2025-07-21 PROCEDURE — 45380 COLONOSCOPY AND BIOPSY: CPT | Performed by: INTERNAL MEDICINE

## 2025-07-21 PROCEDURE — 2720000007 HC OR 272 NO HCPCS

## 2025-07-21 PROCEDURE — 2500000001 HC RX 250 WO HCPCS SELF ADMINISTERED DRUGS (ALT 637 FOR MEDICARE OP): Performed by: INTERNAL MEDICINE

## 2025-07-21 PROCEDURE — 7100000010 HC PHASE TWO TIME - EACH INCREMENTAL 1 MINUTE

## 2025-07-21 PROCEDURE — 2500000004 HC RX 250 GENERAL PHARMACY W/ HCPCS (ALT 636 FOR OP/ED): Mod: JW | Performed by: ANESTHESIOLOGIST ASSISTANT

## 2025-07-21 PROCEDURE — 3700000001 HC GENERAL ANESTHESIA TIME - INITIAL BASE CHARGE

## 2025-07-21 PROCEDURE — 3700000002 HC GENERAL ANESTHESIA TIME - EACH INCREMENTAL 1 MINUTE

## 2025-07-21 PROCEDURE — 7100000009 HC PHASE TWO TIME - INITIAL BASE CHARGE

## 2025-07-21 PROCEDURE — A45380 PR COLONOSCOPY,BIOPSY: Performed by: ANESTHESIOLOGY

## 2025-07-21 PROCEDURE — A45380 PR COLONOSCOPY,BIOPSY: Performed by: ANESTHESIOLOGIST ASSISTANT

## 2025-07-21 RX ORDER — DEXTROMETHORPHAN/PSEUDOEPHED 2.5-7.5/.8
DROPS ORAL AS NEEDED
Status: COMPLETED | OUTPATIENT
Start: 2025-07-21 | End: 2025-07-21

## 2025-07-21 RX ORDER — PROPOFOL 10 MG/ML
INJECTION, EMULSION INTRAVENOUS AS NEEDED
Status: DISCONTINUED | OUTPATIENT
Start: 2025-07-21 | End: 2025-07-21

## 2025-07-21 RX ADMIN — SIMETHICONE 333 MG: 20 EMULSION ORAL at 13:27

## 2025-07-21 RX ADMIN — PROPOFOL 100 MCG/KG/MIN: 10 INJECTION, EMULSION INTRAVENOUS at 13:25

## 2025-07-21 RX ADMIN — PROPOFOL 100 MG: 10 INJECTION, EMULSION INTRAVENOUS at 13:24

## 2025-07-21 SDOH — HEALTH STABILITY: MENTAL HEALTH: CURRENT SMOKER: 0

## 2025-07-21 ASSESSMENT — PAIN - FUNCTIONAL ASSESSMENT
PAIN_FUNCTIONAL_ASSESSMENT: 0-10

## 2025-07-21 ASSESSMENT — PAIN SCALES - GENERAL
PAINLEVEL_OUTOF10: 0 - NO PAIN
PAIN_LEVEL: 0
PAINLEVEL_OUTOF10: 0 - NO PAIN

## 2025-07-21 ASSESSMENT — COLUMBIA-SUICIDE SEVERITY RATING SCALE - C-SSRS
1. IN THE PAST MONTH, HAVE YOU WISHED YOU WERE DEAD OR WISHED YOU COULD GO TO SLEEP AND NOT WAKE UP?: NO
2. HAVE YOU ACTUALLY HAD ANY THOUGHTS OF KILLING YOURSELF?: NO
6. HAVE YOU EVER DONE ANYTHING, STARTED TO DO ANYTHING, OR PREPARED TO DO ANYTHING TO END YOUR LIFE?: NO

## 2025-07-21 NOTE — ANESTHESIA POSTPROCEDURE EVALUATION
Patient: Patrice Licona    Procedure Summary       Date: 07/21/25 Room / Location: [facility] [place]    Anesthesia Start: 1321 Anesthesia Stop: 1345    Procedure: COLONOSCOPY Diagnosis: Adenomatous polyp of colon, unspecified part of colon    Scheduled Providers: Kyra PASCUAL MD Responsible Provider: Yesica Cherry MD    Anesthesia Type: MAC ASA Status: 1            Anesthesia Type: MAC    Vitals Value Taken Time   /55 07/21/25 13:45   Temp 36.4 07/21/25 13:45   Pulse 78 07/21/25 13:45   Resp 12 07/21/25 13:45   SpO2 96 07/21/25 13:45       Anesthesia Post Evaluation    Patient location during evaluation: PACU  Patient participation: complete - patient participated  Level of consciousness: awake and alert  Pain score: 0  Pain management: adequate  There was medical reason for not using a multimodal analgesia pain management approach.  Airway patency: patent  Two or more strategies used to mitigate risk of obstructive sleep apnea  Cardiovascular status: acceptable and stable  Respiratory status: acceptable, room air, nonlabored ventilation, unassisted and spontaneous ventilation  Hydration status: acceptable  Postoperative Nausea and Vomiting: none        No notable events documented.

## 2025-07-21 NOTE — H&P
Outpatient Hospital Procedure    Patient Profile-Procedures  Initial Info  Patient Demographics  Name Patrice Licona  Date of Birth 1960  MRN [id number]  Address   [address] [place] [id number] [place] [address]    Primary Phone Number [phone]  Secondary Phone Number    Rosales Byrd    Procedures       Indication:  Screening - increased risk - mom with CRC 60    Anesthesia Indication: anticipated intolerance to moderate sedation    Primary contact name and number   Extended Emergency Contact Information  Primary Emergency Contact: Nestor Licona  Home Phone: [phone]  Relation: Spouse    General Health  Weight   Vitals:    07/21/25 1222   Weight: 68 kg (150 lb)     BMI Body mass index is 21.83 kg/m².    Allergies  RX Allergies[1]    Past Medical History   Medical History[2]    Provider assessment  Diagnosis  Medication Reviewed - yes  Prior to Admission medications   Medication Sig Start Date End Date Taking? Authorizing Provider   atorvastatin (Lipitor) 10 mg tablet TAKE 1 TABLET DAILY FOR HIGH CHOLESTEROL 1/27/25  Yes Rosales Light MD   multivitamin (Multiple Vitamins) tablet Take by mouth. 7/28/14  Yes Historical Provider, MD   psyllium (Metamucil, sugar,) powder Take by mouth. 12/30/22  Yes Historical Provider, MD   sertraline (Zoloft) 100 mg tablet TAKE 1 TABLET DAILY 3/5/25  Yes Rosales Light MD   sodium,potassium,mag sulfates (Suprep Bowel Prep Kit) 17.5-3.13-1.6 gram solution Take 1 bottle by mouth every 12 hours. 7/10/25  Yes Kyra PASCUAL MD       This is my H&P    Physical Exam  Physical Exam  Constitutional:       Comments: Awake   HENT:      Head: Normocephalic.     Cardiovascular:      Rate and Rhythm: Normal rate and regular rhythm.   Pulmonary:      Effort: Pulmonary effort is normal.      Breath sounds: Normal breath sounds.   Abdominal:      General: Bowel sounds are normal.      Palpations: Abdomen is soft.     Neurological:      Mental Status: He  is alert.     Psychiatric:         Mood and Affect: Mood normal.           Oropharyngeal Classification II (hard and soft palate, upper portion of tonsils and uvula visible)  ASA PS Classification 2  Sedation Plan Deep  Procedure Plan - pre-procedural (re)assesment completed by physician:  discharge/transfer patient when discharge criteria met    Kyra William MD  7/21/2025 1:10 PM           [1] No Known Allergies  [2]   Past Medical History:  Diagnosis Date    Abnormal results of thyroid function studies 03/03/2014    Thyroid function test abnormal    Acute sinusitis, unspecified     Acute sinusitis    Encounter for general adult medical examination without abnormal findings 04/30/2015    Preventative health care    Fracture of corpus cavernosum penis, sequela 04/25/2022    Penile fracture, sequela    Hyperlipidemia     Impingement syndrome of left shoulder 10/30/2015    Rotator cuff impingement syndrome, left    Nodular prostate without lower urinary tract symptoms 07/19/2019    Prostate nodule    Nontoxic single thyroid nodule 04/30/2015    Colloid thyroid nodule    Other amnesia 12/27/2017    Complaints of memory disturbance    Other conditions influencing health status     Pneumonia    Other nonspecific abnormal finding of lung field     Radiologic findings of lung field, abnormal    Other specified personal risk factors, not elsewhere classified 06/20/2019    10 year risk of MI or stroke < 7.5%    Pain in left shoulder 10/29/2015    Arthralgia of left shoulder region    Paresthesia of skin 10/13/2020    Tingling    Personal history of other diseases of the respiratory system 12/22/2016    History of acute sinusitis    Personal history of other diseases of the respiratory system     History of allergic rhinitis    Personal history of other specified conditions 06/24/2013    History of vertigo    Personal history of other specified conditions 04/25/2022    History of elevated prostate specific antigen (PSA)     Personal history of other specified conditions     History of vertigo    Umbilical hernia without obstruction or gangrene 01/21/2014    Umbilical hernia    Unspecified conjunctivitis     Conjunctivitis of both eyes

## 2025-07-21 NOTE — ANESTHESIA PREPROCEDURE EVALUATION
Patient: Patrice Licona    Procedure Information       Date/Time: 07/21/25 1250    Scheduled providers: Kyra PASCUAL MD    Procedure: COLONOSCOPY    Location: [facility]            Relevant Problems   Cardiac   (+) Hyperlipidemia      Neuro   (+) Chronic headaches   (+) Mild anxiety      GI   (+) Acid reflux      /Renal   (+) Benign enlargement of prostate      HEENT   (+) Acute sinusitis       Clinical information reviewed:   Tobacco  Allergies  Meds   Med Hx  Surg Hx   Fam Hx  Soc Hx        NPO Detail:  NPO/Void Status  Date of Last Liquid: 07/21/25  Time of Last Liquid: 0530  Date of Last Solid: 07/19/25  Last Intake Type: Clear fluids  Time of Last Void: 1000         Physical Exam    Airway  Mallampati: I  TM distance: >3 FB  Neck ROM: full  Mouth opening: 3 or more finger widths     Cardiovascular   Rhythm: regular  Rate: normal     Dental - normal exam     Pulmonary Breath sounds clear to auscultation     Abdominal - normal exam           Anesthesia Plan    History of general anesthesia?: yes  History of complications of general anesthesia?: no    ASA 1     MAC     The patient is not a current smoker.    intravenous induction   Anesthetic plan and risks discussed with patient.    Plan discussed with [facility].

## 2025-07-21 NOTE — DISCHARGE INSTRUCTIONS
Patient Instructions Post Endoscopy Procedure      The anesthetics, sedatives or narcotics which were given to you today will be acting in your body for the next 24 hours, so you might feel a little sleepy or groggy.  This feeling should slowly wear off. Carefully read and follow the instructions.     You received sedation today:  - Do not drive or operate any machinery or power tools of any kind.   - No alcoholic beverages today, not even beer or wine.  - Do not make any important decisions or sign any legal documents.  - No over the counter medications that contain alcohol or that may cause drowsiness.    While it is common to experience mild to moderate abdominal distention, gas, or belching after your procedure, if any of these symptoms occur following discharge from the GI Lab or within one week of having your procedure, call the [facility] to be advised whether a visit to your nearest Urgent Care or Emergency Department is indicated.  Take this paper with you if you go.   - If you develop an allergic reaction to the medications that were given during your procedure such as difficulty breathing, rash, hives, severe nausea, vomiting or lightheadedness.  - If you experience chest pain, shortness of breath, severe abdominal pain, fevers and chills.  -If you develop signs and symptoms of bleeding such as blood in your spit, if your stools turn black, tarry, or bloody  - If you have not urinated within 8 hours following your procedure.  - If your IV site becomes painful, red, inflamed, or looks infected.      Your physician recommends the additional following instructions:    -You have a contact number available for emergencies. The signs and symptoms of potential delayed complications were discussed with you. You may return to normal activities tomorrow.  -Resume your previous diet or other if specified.  -Continue your present medications.   -We are waiting for your pathology results, if  applicable. The results will be available in [employer]. I will send you a message with any recommendations.  -The findings and recommendations have been discussed with you and/or family.  -Please see Medication Reconciliation Form for new medication/medications prescribed.     In the event of an emergency please go to the closest Emergency Department or call Dr. William at [phone]

## 2025-07-28 LAB
LABORATORY COMMENT REPORT: NORMAL
PATH REPORT.FINAL DX SPEC: NORMAL
PATH REPORT.GROSS SPEC: NORMAL
PATH REPORT.RELEVANT HX SPEC: NORMAL
PATH REPORT.TOTAL CANCER: NORMAL

## 2025-07-31 ENCOUNTER — APPOINTMENT (OUTPATIENT)
Dept: PRIMARY CARE | Facility: CLINIC | Age: 65
End: 2025-07-31
Payer: COMMERCIAL

## 2025-07-31 VITALS
BODY MASS INDEX: 22.49 KG/M2 | HEART RATE: 54 BPM | DIASTOLIC BLOOD PRESSURE: 82 MMHG | OXYGEN SATURATION: 98 % | WEIGHT: 148.4 LBS | SYSTOLIC BLOOD PRESSURE: 134 MMHG | HEIGHT: 68 IN

## 2025-07-31 DIAGNOSIS — Z23 IMMUNIZATION DUE: ICD-10-CM

## 2025-07-31 DIAGNOSIS — G89.29 CHRONIC NONINTRACTABLE HEADACHE, UNSPECIFIED HEADACHE TYPE: ICD-10-CM

## 2025-07-31 DIAGNOSIS — J30.1 SEASONAL ALLERGIC RHINITIS DUE TO POLLEN: ICD-10-CM

## 2025-07-31 DIAGNOSIS — R93.1 AGATSTON CORONARY ARTERY CALCIUM SCORE LESS THAN 100: ICD-10-CM

## 2025-07-31 DIAGNOSIS — K21.9 GASTROESOPHAGEAL REFLUX DISEASE WITHOUT ESOPHAGITIS: ICD-10-CM

## 2025-07-31 DIAGNOSIS — R51.9 CHRONIC NONINTRACTABLE HEADACHE, UNSPECIFIED HEADACHE TYPE: ICD-10-CM

## 2025-07-31 DIAGNOSIS — E78.2 MIXED HYPERLIPIDEMIA: ICD-10-CM

## 2025-07-31 DIAGNOSIS — K57.30 DIVERTICULOSIS OF COLON: ICD-10-CM

## 2025-07-31 DIAGNOSIS — Z00.00 ANNUAL PHYSICAL EXAM: Primary | ICD-10-CM

## 2025-07-31 PROCEDURE — 93000 ELECTROCARDIOGRAM COMPLETE: CPT | Performed by: INTERNAL MEDICINE

## 2025-07-31 PROCEDURE — 3008F BODY MASS INDEX DOCD: CPT | Performed by: INTERNAL MEDICINE

## 2025-07-31 PROCEDURE — 99396 PREV VISIT EST AGE 40-64: CPT | Performed by: INTERNAL MEDICINE

## 2025-07-31 RX ORDER — PNEUMOCOCCAL 20-VALENT CONJUGATE VACCINE 2.2; 2.2; 2.2; 2.2; 2.2; 2.2; 2.2; 2.2; 2.2; 2.2; 2.2; 2.2; 2.2; 2.2; 2.2; 2.2; 4.4; 2.2; 2.2; 2.2 UG/.5ML; UG/.5ML; UG/.5ML; UG/.5ML; UG/.5ML; UG/.5ML; UG/.5ML; UG/.5ML; UG/.5ML; UG/.5ML; UG/.5ML; UG/.5ML; UG/.5ML; UG/.5ML; UG/.5ML; UG/.5ML; UG/.5ML; UG/.5ML; UG/.5ML; UG/.5ML
0.5 INJECTION, SUSPENSION INTRAMUSCULAR ONCE
Qty: 0.5 ML | Refills: 0 | Status: SHIPPED | OUTPATIENT
Start: 2025-07-31 | End: 2025-07-31

## 2025-07-31 NOTE — PROGRESS NOTES
"Subjective   Patient ID: Patrice Licona is a 64 y.o. male who presents for Annual Exam.    Here for wellness visit.  Last year last July.  Overall doing well.  Enjoying summer vacation.  It has been busy, as they have the grandchildren 3 days a week.  Those days are both stressful.  School begins in several weeks.  With the extra activity, occasional headache he notes.  It is not really severe and does not really concern and he notes    Recently had his colonoscopy.         Review of Systems    Objective   /82 (BP Location: Left arm, Patient Position: Sitting, BP Cuff Size: Adult)   Pulse 54   Ht 1.724 m (5' 7.87\")   Wt 67.3 kg (148 lb 6.4 oz)   SpO2 98%   BMI 22.65 kg/m²     Physical Exam  Constitutional:       Appearance: Normal appearance.   HENT:      Head: Normocephalic and atraumatic.      Right Ear: Tympanic membrane normal.      Left Ear: Tympanic membrane normal.      Nose: Nose normal.     Eyes:      General: No scleral icterus.     Extraocular Movements: Extraocular movements intact.      Conjunctiva/sclera: Conjunctivae normal.      Pupils: Pupils are equal, round, and reactive to light.       Cardiovascular:      Rate and Rhythm: Normal rate and regular rhythm.      Pulses: Normal pulses.      Heart sounds: Normal heart sounds. No murmur heard.  Pulmonary:      Effort: Pulmonary effort is normal. No respiratory distress.      Breath sounds: Normal breath sounds. No stridor. No wheezing.   Abdominal:      General: Abdomen is flat. Bowel sounds are normal. There is no distension.      Palpations: Abdomen is soft. There is no mass.      Tenderness: There is no abdominal tenderness. There is no guarding.     Musculoskeletal:         General: No swelling, tenderness or deformity. Normal range of motion.      Cervical back: Normal range of motion and neck supple. No tenderness.   Lymphadenopathy:      Cervical: No cervical adenopathy.     Skin:     General: Skin is warm and dry.      Findings: No " lesion or rash.     Neurological:      General: No focal deficit present.      Mental Status: He is alert and oriented to person, place, and time.      Cranial Nerves: No cranial nerve deficit.      Motor: No weakness.     Psychiatric:         Mood and Affect: Mood normal.         Behavior: Behavior normal.         Thought Content: Thought content normal.         Judgment: Judgment normal.         Assessment/Plan   Problem List Items Addressed This Visit           ICD-10-CM    Acid reflux K21.9    Allergic rhinitis J30.9    Chronic headaches R51.9, G89.29    Hyperlipidemia E78.5    Agatston coronary artery calcium score less than 100 R93.1    Relevant Orders    Rhythm ECG 1-3 Lead (Completed)    Annual physical exam - Primary Z00.00    Diverticulosis of colon K57.30     Other Visit Diagnoses         Codes      Immunization due     Z23                Portions of this encounter note have been copied from my previous note dated 7/31/24  , which have been updated where appropriate and all reflect my current medical decision making from today.         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  2 daughters and 2 grandchildren-all live locally    Lab work reviewed from 7/15/2025  Colonoscopy report and biopsy reviewed from 7/21/2025      Hx Chest pain syndrome-1/24-not exertional-rather atypical.  Chest x-ray ordered-unremarkable.  Resolved      Dyslipidemia- Atorvastatin started in 2021 w/ elevated 10 yr cardiac risk; goal LDL under 100.  Will follow.          annual lipids ordered last time-awaiting results.  He will set up a CT calcium score for further evaluation accordingly             Calcium score 0 June 2024              He will do lipids before follow-up           7/25-LDL favorable at 80.  BMI 22.7.  He will continue healthy lifestyle measures     Exercise routine-enjoys walking. Encouraged more aerobic activity as he is able to     Right thyroid  nodule-he states his endocrinologist at the [facility] did not feel further follow-up was necessary and so he no longer sees him. Nondiagnostic biopsy/aspiration 2010.    Stable ultrasound July 2019 for 3 yrs showing 1 x 1 cm hypoechoic right thyroid nodule. Will plan to re-image at 4 yrs           Feb '23 thyroid ultrasound shows right thyroid nodule benign.      Seasonal allergies-he will use medication seasonally. Not problematic of late; Zyrtec and Flonase PRN.   More winter allergies. Several weeks of congestion sinus pressure and ear popping. Discussed anatomy and vertigo. Encouraged Flonase and Zyrtec consistently for 3 to 4 weeks then as needed           7/25   improved presently-uses Zyrtec nightly    Cervical spasms-with occasional neck popping-x-rays ordered 1/24.  Handout provided with 2 pages of exercises-encouraged 20 minutes once or twice daily with a heating pad and stretching and follow-up if not improved           Improved presently    Shoulder spasms-occasionally noted-encouraged heating and stretching to prevent frozen shoulder scenario or sleeping shoulder pain.     Tension dytkgyywa-bdmkdkrzvisj-durqdwzo imaging in neurology by Dr. Fu around 2012. During the pandemic he has noticed more headaches. This year he is commuting back to school. The students do not really want to be in school. He remains on his sertraline which has been helpful.    Encourage stress reduction measures. He does enjoy painting which he did a lot of in the summer, puzzles and playing his guitar. Suggested meditation apps including head space. He enjoys walking-suggested exercising to help reduce stress as well. Heating pad and stretching for the neck encouraged-exercises provided               7/25-it has been a busy summer, watching grandchildren 3 days weekly-occasional mild headache at times in the week.  It does not concern him he notes     mild depression-doing well with present routine and sertraline. He  will continue healthy efforts towards maintaining his balance.    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 Looking forward to getting up to [employer] in their RV for camping around [place].               Summer vacation has gone well.  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  Its been more relaxing this summer.           1/25-at this point doing well with present medication regiment    Acid reflux he will continue medications accordingly     Adenomatous colon polyps/Family history of colon cancer-. Colonoscopy updated July 2025            next colonoscopy in 5 years-July 2030     External hemorrhoids-rather bothersome as they come out daily after bowel movement and he has to push them back in. He is taking fluid and fiber. I suggested Desitin ointment as needed and follow-up with the colorectal clinic.   Improved presently, w/ metamucil, following visit w/ Dr. Golden for int. hem. Jun & Aug '22.            7/25-both internal and external hemorrhoids noted on colonoscopy.  Encouraged consistent fluid and fiber to prevent any straining    Severe diverticulosis-noted on 7/25 colonoscopy.  Strongly encouraged ample fluid and fiber to prevent any straining or constipation.     Elevated PSA velocity - 1st prostate biopsy negative around age 56, worrisome MRI February 22 prompted a 2nd prostate biopsy Apr '22 - negative. He'll see Dr. Amaya in 1 yr - Apr '23.              PSA  2.13 in 7/25 remains stable from where it was 3-1/2 years ago    Hx Left wrist injury-after a fall in 2020- occasionally sore-he is right-handed. He will use caution with overuse            No recent concerns     Recurrent/chronic left shoulder pain-a bit worse recently lifting is done. He will see Dr. English as needed. Using a chiropractor several times, which has helped. Improved w/ input per Chiropractor   Shoulder much better not carrying heavy book bag   At this point both shoulders are a problem. Once  again exercises provided encouraged him to do these consistently and follow-up with physical therapy if not improved     Low back pain - after moving. improved w/ chiropractor input.     Tingling in his toes-  Less noticeable presently with changing shoes suggesting compressive neuropathy           Not an active issue     fallen arches -he now is on his feet as an  most of the day. Encouraged optimizing arch support accordingly        Dental care-encouraged semiannual dental visits.             Dental exam due for tooth pain     Skin care-he will continue dermatology as needed. sun screen encouraged   Dermatology appt. in Jan '22 for skin check     Glasses/vision care- vision exam updated early 2022; no changes fortunately.      Flu shot encouraged each fall- Sep '23     covid series completed- booster done too;           new booster declined 12/22     Prevnar 20-suggested 7/25    RSV vacc completed 2/24    Shingrix series-completed 3/24     Follow-up semiannually, sooner as needed     Charting was completed using voice recognition technology and may include unintended errors.

## 2025-08-03 PROBLEM — Z00.00 ANNUAL PHYSICAL EXAM: Status: ACTIVE | Noted: 2025-08-03

## 2025-08-03 PROBLEM — K57.30 DIVERTICULOSIS OF COLON: Status: ACTIVE | Noted: 2025-08-03

## 2025-09-01 DIAGNOSIS — F32.A DEPRESSION, UNSPECIFIED DEPRESSION TYPE: ICD-10-CM

## 2025-09-02 RX ORDER — SERTRALINE HYDROCHLORIDE 100 MG/1
100 TABLET, FILM COATED ORAL DAILY
Qty: 90 TABLET | Refills: 1 | Status: SHIPPED | OUTPATIENT
Start: 2025-09-02

## 2026-02-16 ENCOUNTER — APPOINTMENT (OUTPATIENT)
Dept: PRIMARY CARE | Facility: CLINIC | Age: 66
End: 2026-02-16
Payer: COMMERCIAL

## 2026-08-03 ENCOUNTER — APPOINTMENT (OUTPATIENT)
Dept: PRIMARY CARE | Facility: CLINIC | Age: 66
End: 2026-08-03
Payer: COMMERCIAL